# Patient Record
Sex: FEMALE | Race: OTHER
[De-identification: names, ages, dates, MRNs, and addresses within clinical notes are randomized per-mention and may not be internally consistent; named-entity substitution may affect disease eponyms.]

---

## 2020-05-12 ENCOUNTER — TRANSCRIPTION ENCOUNTER (OUTPATIENT)
Age: 20
End: 2020-05-12

## 2020-07-20 ENCOUNTER — TRANSCRIPTION ENCOUNTER (OUTPATIENT)
Age: 20
End: 2020-07-20

## 2022-07-28 ENCOUNTER — APPOINTMENT (OUTPATIENT)
Dept: OBGYN | Facility: CLINIC | Age: 22
End: 2022-07-28

## 2022-11-07 ENCOUNTER — APPOINTMENT (OUTPATIENT)
Dept: OBGYN | Facility: CLINIC | Age: 22
End: 2022-11-07

## 2022-11-07 VITALS
SYSTOLIC BLOOD PRESSURE: 130 MMHG | WEIGHT: 150 LBS | BODY MASS INDEX: 26.91 KG/M2 | DIASTOLIC BLOOD PRESSURE: 82 MMHG | HEIGHT: 62.5 IN

## 2022-11-07 DIAGNOSIS — Z00.00 ENCOUNTER FOR GENERAL ADULT MEDICAL EXAMINATION W/OUT ABNORMAL FINDINGS: ICD-10-CM

## 2022-11-07 DIAGNOSIS — Z78.9 OTHER SPECIFIED HEALTH STATUS: ICD-10-CM

## 2022-11-07 PROCEDURE — 99385 PREV VISIT NEW AGE 18-39: CPT

## 2023-10-13 ENCOUNTER — APPOINTMENT (OUTPATIENT)
Dept: OBGYN | Facility: CLINIC | Age: 23
End: 2023-10-13

## 2024-01-05 ENCOUNTER — APPOINTMENT (OUTPATIENT)
Dept: OBGYN | Facility: CLINIC | Age: 24
End: 2024-01-05
Payer: COMMERCIAL

## 2024-01-05 VITALS
WEIGHT: 155 LBS | HEIGHT: 62 IN | SYSTOLIC BLOOD PRESSURE: 136 MMHG | DIASTOLIC BLOOD PRESSURE: 93 MMHG | BODY MASS INDEX: 28.52 KG/M2

## 2024-01-05 DIAGNOSIS — Z01.411 ENCOUNTER FOR GYNECOLOGICAL EXAMINATION (GENERAL) (ROUTINE) WITH ABNORMAL FINDINGS: ICD-10-CM

## 2024-01-05 PROCEDURE — 36415 COLL VENOUS BLD VENIPUNCTURE: CPT

## 2024-01-05 PROCEDURE — 99395 PREV VISIT EST AGE 18-39: CPT

## 2024-01-05 NOTE — SIGNATURES
[TextEntry] : This note was written by Renaldo Rivas on 01/05/2024 actively solely JAROCHO Galan M.D.  All medical record entries made by the Scribe were at my, JAROCHO Galan M.D. direction and personally dictated by me on 01/05/2024. I have personally reviewed the chart and agree that the record reflects my personal performance of the history, physical exam, assessment, and plan.

## 2024-01-05 NOTE — PLAN
[FreeTextEntry1] : Routine Gyn Exam and oligomenorrhea BSA, calcium, vitamin D and exercise d/w pt No pap, could not tolerate speculum Gc/Chl off urine Advised tampon use  and how to use tampon descibed Advised pt to see PCP and dermatologist annually  Irregular menses/PCOS: Labs drawn Pt to schedule pelvic sono Nature of PCOS and how it is diagnosed was discussed She asks if stress may have caused skipped menses.  She declines OCP at this time a menses have been more regular  RTO for pelvic sono or PRN and in one year

## 2024-01-05 NOTE — PHYSICAL EXAM
[Chaperone Present] : A chaperone was present in the examining room during all aspects of the physical examination [Appropriately responsive] : appropriately responsive [Alert] : alert [No Acute Distress] : no acute distress [No Lymphadenopathy] : no lymphadenopathy [Regular Rate Rhythm] : regular rate rhythm [No Murmurs] : no murmurs [Clear to Auscultation B/L] : clear to auscultation bilaterally [Soft] : soft [Non-tender] : non-tender [Non-distended] : non-distended [No HSM] : No HSM [No Lesions] : no lesions [No Mass] : no mass [Oriented x3] : oriented x3 [Examination Of The Breasts] : a normal appearance [No Masses] : no breast masses were palpable [Labia Majora] : normal [Labia Minora] : normal [Normal] : normal [Uterine Adnexae] : normal [FreeTextEntry1] : margaux [FreeTextEntry4] : I can check vagina with one digit.  Uncomfortable for patient.  She cannot tolerate pediatric speculum for pap.  [FreeTextEntry5] : Normal on palpation  [FreeTextEntry6] : Difficult exam due to discomfort

## 2024-01-05 NOTE — HISTORY OF PRESENT ILLNESS
[No] : Patient does not have concerns regarding sex [FreeTextEntry1] : 01/05/2024. YEIMI RAO 23 year old female G0 LMP 12/28/23. She presents to establish gyn care.  Pt had skipped menses throughout most of 2022 and 2023. She notes her menses for the past 3 months have been monthly, not too heavy, not painful. She denies intermenstrual bleeding. Pt has noticed recent facial hair growth, acne, and some weight gain. Pt had an initial gyn exam in 11/22 where she was unable to tolerate a speculum exam, so no pap smear was done.  She denies abdominal and pelvic pain, no vaginal discharge or vaginitis symptoms. She reports normal urination, no dysuria, no incontinence of urine. BM is normal per patient, no blood in stool or constipation/diarrhea. She has not yet had penetrative intercourse, and has had oral sex with men. She declines STI testing  Obhx: G0 GYNhx: Denies history of fibroids, abnl pap, STI, pelvic infection, breast issues PMH: psoriasis on scalp PSH: strabismus surgery Med: Clobetasol for scalp All: NKDA Famhx: Denies FHx of breast, ovarian, uterine, colon, pancreatic, or prostate cancer. 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   [Previously active] : previously active [Men] : men [Oral] : oral [FreeTextEntry2] : No penetrative intercourse

## 2024-01-06 ENCOUNTER — TRANSCRIPTION ENCOUNTER (OUTPATIENT)
Age: 24
End: 2024-01-06

## 2024-01-07 LAB
ESTRADIOL SERPL-MCNC: 51 PG/ML
FSH SERPL-MCNC: 6.6 IU/L
GLUCOSE SERPL-MCNC: 79 MG/DL
HCG SERPL-MCNC: <1 MIU/ML
INSULIN P FAST SERPL-ACNC: 23.9 UU/ML
LH SERPL-ACNC: 14.1 IU/L
T4 FREE SERPL-MCNC: 1.3 NG/DL
TSH SERPL-ACNC: 1.22 UIU/ML

## 2024-01-11 ENCOUNTER — NON-APPOINTMENT (OUTPATIENT)
Age: 24
End: 2024-01-11

## 2024-01-15 LAB — 17OHP SERPL-MCNC: 103 NG/DL

## 2024-01-18 ENCOUNTER — APPOINTMENT (OUTPATIENT)
Dept: OBGYN | Facility: CLINIC | Age: 24
End: 2024-01-18
Payer: COMMERCIAL

## 2024-01-18 DIAGNOSIS — N91.5 OLIGOMENORRHEA, UNSPECIFIED: ICD-10-CM

## 2024-01-18 LAB
C TRACH RRNA SPEC QL NAA+PROBE: NOT DETECTED
DHEA-S SERPL-MCNC: 453 UG/DL
ESTIMATED AVERAGE GLUCOSE: 117 MG/DL
HBA1C MFR BLD HPLC: 5.7 %
INSULIN FREE SERPL-MCNC: 12 UU/ML
INSULIN: 12 UU/ML
N GONORRHOEA RRNA SPEC QL NAA+PROBE: NOT DETECTED
PROLACTIN SERPL-MCNC: 28.4 NG/ML
SOURCE AMPLIFICATION: NORMAL
TESTOST FREE SERPL-MCNC: 3.4 PG/ML
TESTOST SERPL-MCNC: 65.1 NG/DL

## 2024-01-18 PROCEDURE — 99212 OFFICE O/P EST SF 10 MIN: CPT | Mod: 95

## 2024-01-18 NOTE — HISTORY OF PRESENT ILLNESS
[FreeTextEntry1] : 01/05/2024. YEIMI RAO 23 year old female G0 LMP 12/28/23. She presents to establish gyn care.  Pt had skipped menses throughout most of 2022 and 2023. She notes her menses for the past 3 months have been monthly, not too heavy, not painful. She denies intermenstrual bleeding. Pt has noticed recent facial hair growth, acne, and some weight gain.  Labs were done at her annual and we are reviewing them today  Obhx: G0 GYNhx: Denies history of fibroids, abnl pap, STI, pelvic infection, breast issues PMH: psoriasis on scalp PSH: strabismus surgery Med: Clobetasol for scalp All: NKDA Famhx: Denies FHx of breast, ovarian, uterine, colon, pancreatic, or prostate cancer. 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

## 2024-01-18 NOTE — REASON FOR VISIT
[Home] : at home, [unfilled] , at the time of the visit. [Medical Office: ([facility])___] : at the medical office located in  [Patient] : the patient [Follow-Up] : a follow-up evaluation of

## 2024-01-18 NOTE — PLAN
[FreeTextEntry1] : Discussed lab results  Prolactin was elevated: will repeat fasting in the morning at a [facility] lab. Rx entered  HgbA1C was 5.7 This is the lower end of prediabetic range Possible Metformin discussed.  Diet and exercise discussed She desires to try diet and exercise for now  Increased DHEAS: C/W PCOS Advised to RTO for pelvic sono Discussed possible COCP If no menses for 3 mos she is to let me know The nature of PCOS was discussed  Follow up in 3-4 mos

## 2024-01-24 LAB — PROLACTIN SERPL-MCNC: 21 NG/ML

## 2024-01-25 ENCOUNTER — NON-APPOINTMENT (OUTPATIENT)
Age: 24
End: 2024-01-25

## 2024-02-21 ENCOUNTER — APPOINTMENT (OUTPATIENT)
Dept: OBGYN | Facility: CLINIC | Age: 24
End: 2024-02-21
Payer: COMMERCIAL

## 2024-02-21 ENCOUNTER — ASOB RESULT (OUTPATIENT)
Age: 24
End: 2024-02-21

## 2024-02-21 PROCEDURE — 76856 US EXAM PELVIC COMPLETE: CPT

## 2024-03-04 ENCOUNTER — NON-APPOINTMENT (OUTPATIENT)
Age: 24
End: 2024-03-04

## 2024-03-07 ENCOUNTER — NON-APPOINTMENT (OUTPATIENT)
Age: 24
End: 2024-03-07

## 2024-03-18 ENCOUNTER — NON-APPOINTMENT (OUTPATIENT)
Age: 24
End: 2024-03-18

## 2024-03-25 ENCOUNTER — APPOINTMENT (OUTPATIENT)
Dept: INTERNAL MEDICINE | Facility: CLINIC | Age: 24
End: 2024-03-25
Payer: COMMERCIAL

## 2024-03-25 VITALS
SYSTOLIC BLOOD PRESSURE: 90 MMHG | TEMPERATURE: 98.5 F | BODY MASS INDEX: 28.71 KG/M2 | HEART RATE: 88 BPM | HEIGHT: 62 IN | WEIGHT: 156 LBS | OXYGEN SATURATION: 98 % | DIASTOLIC BLOOD PRESSURE: 60 MMHG

## 2024-03-25 DIAGNOSIS — R73.09 OTHER ABNORMAL GLUCOSE: ICD-10-CM

## 2024-03-25 DIAGNOSIS — R53.83 OTHER FATIGUE: ICD-10-CM

## 2024-03-25 PROCEDURE — 99203 OFFICE O/P NEW LOW 30 MIN: CPT

## 2024-03-25 PROCEDURE — G2211 COMPLEX E/M VISIT ADD ON: CPT

## 2024-03-25 NOTE — PLAN
[FreeTextEntry1] : Cough/URI/Infectious mononucleosis - advised symptomatic relief. Will check labs PCOS - on metformin, c/w same, f/u with GYN, has endocrinology appt pending Fatigue - will check labs

## 2024-03-25 NOTE — HISTORY OF PRESENT ILLNESS
[FreeTextEntry8] : 23 year old female presents with complaints of cough and fatigue for 1 week.  She went to urgent care, viral panel was negative, she was diagnosed with infectious mononucleosis and started on prednisone/ibuprofen.  Her cough is still lingering but she denies any sputum production, recent fever or travel.

## 2024-03-25 NOTE — REVIEW OF SYSTEMS
[Fever] : fever [Chills] : chills [Fatigue] : fatigue [Vision Problems] : vision problems [Palpitations] : palpitations [Nausea] : nausea [Skin Rash] : skin rash [Discharge] : no discharge [Earache] : no earache [Nasal Discharge] : no nasal discharge [Shortness Of Breath] : no shortness of breath [Abdominal Pain] : no abdominal pain [Vomiting] : no vomiting [Dysuria] : no dysuria [Hematuria] : no hematuria [Joint Pain] : no joint pain [Muscle Pain] : no muscle pain [Itching] : no itching [Headache] : no headache [Memory Loss] : no memory loss [Suicidal] : not suicidal [Easy Bleeding] : no easy bleeding

## 2024-03-26 LAB
ALBUMIN SERPL ELPH-MCNC: 4.4 G/DL
ALP BLD-CCNC: 71 U/L
ALT SERPL-CCNC: 18 U/L
ANION GAP SERPL CALC-SCNC: 12 MMOL/L
AST SERPL-CCNC: 15 U/L
BILIRUB DIRECT SERPL-MCNC: 0.1 MG/DL
BILIRUB INDIRECT SERPL-MCNC: 0.3 MG/DL
BILIRUB SERPL-MCNC: 0.4 MG/DL
BUN SERPL-MCNC: 14 MG/DL
CALCIUM SERPL-MCNC: 9.4 MG/DL
CHLORIDE SERPL-SCNC: 99 MMOL/L
CHOLEST SERPL-MCNC: 169 MG/DL
CO2 SERPL-SCNC: 26 MMOL/L
CREAT SERPL-MCNC: 0.61 MG/DL
EGFR: 127 ML/MIN/1.73M2
ESTIMATED AVERAGE GLUCOSE: 114 MG/DL
GLUCOSE SERPL-MCNC: 78 MG/DL
HBA1C MFR BLD HPLC: 5.6 %
HCT VFR BLD CALC: 43.9 %
HDLC SERPL-MCNC: 64 MG/DL
HGB BLD-MCNC: 14 G/DL
LDLC SERPL CALC-MCNC: 91 MG/DL
MCHC RBC-ENTMCNC: 25.9 PG
MCHC RBC-ENTMCNC: 31.9 GM/DL
MCV RBC AUTO: 81.3 FL
NONHDLC SERPL-MCNC: 105 MG/DL
PLATELET # BLD AUTO: 363 K/UL
POTASSIUM SERPL-SCNC: 4.3 MMOL/L
PROT SERPL-MCNC: 7.1 G/DL
RBC # BLD: 5.4 M/UL
RBC # FLD: 14.3 %
SODIUM SERPL-SCNC: 137 MMOL/L
TRIGL SERPL-MCNC: 77 MG/DL
TSH SERPL-ACNC: 2.94 UIU/ML
WBC # FLD AUTO: 10.84 K/UL

## 2024-03-27 LAB
EBV EA AB SER IA-ACNC: <5 U/ML
EBV EA AB TITR SER IF: ABNORMAL
EBV EA IGG SER QL IA: 21.8 U/ML
EBV EA IGG SER-ACNC: NEGATIVE
EBV EA IGM SER IA-ACNC: NEGATIVE
EBV PATRN SPEC IB-IMP: NORMAL
EBV VCA IGG SER IA-ACNC: 272 U/ML
EBV VCA IGM SER QL IA: <10 U/ML
EPSTEIN-BARR VIRUS CAPSID ANTIGEN IGG: POSITIVE

## 2024-06-05 ENCOUNTER — APPOINTMENT (OUTPATIENT)
Dept: OBGYN | Facility: CLINIC | Age: 24
End: 2024-06-05
Payer: COMMERCIAL

## 2024-06-05 VITALS — DIASTOLIC BLOOD PRESSURE: 94 MMHG | SYSTOLIC BLOOD PRESSURE: 138 MMHG

## 2024-06-05 DIAGNOSIS — E28.2 POLYCYSTIC OVARIAN SYNDROME: ICD-10-CM

## 2024-06-05 PROCEDURE — 99214 OFFICE O/P EST MOD 30 MIN: CPT

## 2024-06-05 NOTE — PLAN
[FreeTextEntry1] :  PCOS: Reviewed 1/2024 Total testosterone (65, elevated), DHEAS (453 elevated) Reviewed 2/2024 pelvic sono - nml appearing ovaries D/w pt trying Metformin 1500 by taking 3 pills at once or continue 1000 mg per day D/w pt possible COCPs, or stopping metformin, and using Aygestin q3 mos- pt to consider Pt has appt w/ endocrinologist to discuss other methods of medication management Advised pt to schedule f/u appt in 3 months  RTO in 3 months or PRN

## 2024-06-05 NOTE — HISTORY OF PRESENT ILLNESS
[FreeTextEntry1] : 06/05/2024. YEIMI RAO 23 year old female G0 LMP She presents for f/u appt for amenorrhea, PCOS.  1/2024 Repeat Prolactin - nml 1/2024 Total testosterone - 65 (elevated) 1/2024 DHEAS - 453 (elevated) 2/2024 pelvic sono - nml appearing ovaries 1/2024 HgbA1C - 5.7 3/2024 Repeat A1C - 5.6  She reports h/o skipped menses for most of 2022 and 2023, improved while on Metformin w/ menses 2/1/2024, 2/26/2024, and 5/8-5/14/2024. She is currently taking Metformin 1000 (2 pill in afternoon), d/c'ed taking 1500 (1 pill morning, 2 pills afternoon) due to nausea after morning dose, was advised by PCP to not take 3 pills at once. She reports nausea is improved w/ food intake but has difficulty eating due to appetite suppression while on Metformin. She is interested in considering other options.  She has seen PCP. She has scheduled an appt w/ endocrinologist.   She denies abdominal and pelvic pain, no vaginal discharge or vaginitis symptoms. She reports normal urination, no dysuria, no incontinence of urine. BM is normal per patient, no blood in stool or constipation/diarrhea.   PMH: psoriasis on scalp Obhx: G0 GYNhx: Denies history of fibroids, abnl pap, STI, pelvic infection, breast issues PSH: strabismus surgery Med: Clobetasol for scalp All: NKDA Famhx: Denies FHx of breast, ovarian, uterine, colon, pancreatic, or prostate cancer. Soc Hx: nonsmoker, no T/D, soc alc Vaccine: S/p Gardasil

## 2024-06-05 NOTE — PHYSICAL EXAM
[Chaperone Present] : A chaperone was present in the examining room during all aspects of the physical examination [Appropriately responsive] : appropriately responsive [Alert] : alert [No Acute Distress] : no acute distress [Oriented x3] : oriented x3 [FreeTextEntry2] : Jp Ventura

## 2024-07-29 ENCOUNTER — NON-APPOINTMENT (OUTPATIENT)
Age: 24
End: 2024-07-29

## 2024-08-07 ENCOUNTER — APPOINTMENT (OUTPATIENT)
Dept: ENDOCRINOLOGY | Facility: CLINIC | Age: 24
End: 2024-08-07

## 2024-08-07 PROBLEM — E66.3 OVERWEIGHT: Status: ACTIVE | Noted: 2024-08-07

## 2024-08-07 PROBLEM — Z83.3 FAMILY HISTORY OF DIABETES MELLITUS: Status: ACTIVE | Noted: 2024-08-07

## 2024-08-07 PROBLEM — Z84.2 FAMILY HISTORY OF OVARIAN CYST: Status: ACTIVE | Noted: 2024-08-07

## 2024-08-07 PROBLEM — N92.6 IRREGULAR MENSES: Status: ACTIVE | Noted: 2024-08-07

## 2024-08-07 PROBLEM — L68.0 HIRSUTISM: Status: ACTIVE | Noted: 2024-08-07

## 2024-08-07 PROCEDURE — 99204 OFFICE O/P NEW MOD 45 MIN: CPT

## 2024-08-07 NOTE — HISTORY OF PRESENT ILLNESS
[FreeTextEntry1] : Ms. RAO is a 23-year-old female who presents for initial endocrine evaluation. She presents with regard to a history of PCOS diagnosed in Jan 2024 and Prediabetes.  She too had prolactin elevation which then returned normal on repeat. GYN told her it may have been initially high after breast exam. MRI of pituitary was thus not done. Following with GYN, Dr. Nobles  She is currently taking metformin 500mg 2 tabs HS (started in Jan 2024). She was supposed to be taking it 3x per day however felt nauseous on higher doses.  Does has soft stools.   She did an abdominal/pelvic US winter 2024, no cysts were seen.   She does have hx of irregular menses since age 19, but now is regular on Metformin. In past, she had gone 8 months without menses oct 2022 - mar 2023 however was then coming monthly afterwards.   She is not on OCP.  No plans for pregnancy at this time.   No issues with hair thinning.  Had issues with acne in college and then resolved. As of late, notes breakouts on body and neck.  She notes hirsutism on chin and upper lip and around belly button, was doing laser for 4 sessions. Now waxing (upper lip), plucking (chin), and shaving (face) every day. Hair grows back within a few days if she is not consistent.    Prior weight was stable growing up however as of last couple of years she has been gaining weight.  She has been trying to work out, walking 3x week on treadmill. She does Pilates 1x week.  She has been trying to control portions and not easing as much.   Current weight 146 lbs, prior 156 lbs in March 2024  Goal weight 125 lbs   Her biggest concern at this time is the weight and hirsutism.   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

## 2024-08-07 NOTE — HISTORY OF PRESENT ILLNESS
[FreeTextEntry1] : Ms. RAO is a 23-year-old female who presents for initial endocrine evaluation. She presents with regard to a history of PCOS diagnosed in Jan 2024 and Prediabetes.  She too had prolactin elevation which then returned normal on repeat. GYN told her it may have been initially high after breast exam. MRI of pituitary was thus not done. Following with GYN, Dr. Nobles  She is currently taking metformin 500mg 2 tabs HS (started in Jan 2024). She was supposed to be taking it 3x per day however felt nauseous on higher doses.  Does has soft stools.   She did an abdominal/pelvic US winter 2024, no cysts were seen.   She does have hx of irregular menses since age 19, but now is regular on Metformin. In past, she had gone 8 months without menses oct 2022 - mar 2023 however was then coming monthly afterwards.   She is not on OCP.  No plans for pregnancy at this time.   No issues with hair thinning.  Had issues with acne in college and then resolved. As of late, notes breakouts on body and neck.  She notes hirsutism on chin and upper lip and around belly button, was doing laser for 4 sessions. Now waxing (upper lip), plucking (chin), and shaving (face) every day. Hair grows back within a few days if she is not consistent.    Prior weight was stable growing up however as of last couple of years she has been gaining weight.  She has been trying to work out, walking 3x week on treadmill. She does Pilates 1x week.  She has been trying to control portions and not easing as much.   Current weight 146 lbs, prior 156 lbs in March 2024  Goal weight 125 lbs   Her biggest concern at this time is the weight and hirsutism.   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

## 2024-08-07 NOTE — ADDENDUM
[FreeTextEntry1] : This note was written by Deborah Ba on 08/07/2024 acting as medical scribe for Dr. Ren Babcock. I, Dr. Ren Babcock, have read and attest that all the information, medical decision making and discharge instructions within are true and accurate.

## 2024-08-07 NOTE — HISTORY OF PRESENT ILLNESS
[FreeTextEntry1] : Ms. RAO is a 23-year-old female who presents for initial endocrine evaluation. She presents with regard to a history of PCOS diagnosed in Jan 2024 and Prediabetes.  She too had prolactin elevation which then returned normal on repeat. GYN told her it may have been initially high after breast exam. MRI of pituitary was thus not done. Following with GYN, Dr. Nobles  She is currently taking metformin 500mg 2 tabs HS (started in Jan 2024). She was supposed to be taking it 3x per day however felt nauseous on higher doses.  Does has soft stools.   She did an abdominal/pelvic US winter 2024, no cysts were seen.   She does have hx of irregular menses since age 19, but now is regular on Metformin. In past, she had gone 8 months without menses oct 2022 - mar 2023 however was then coming monthly afterwards.   She is not on OCP.  No plans for pregnancy at this time.   No issues with hair thinning.  Had issues with acne in college and then resolved. As of late, notes breakouts on body and neck.  She notes hirsutism on chin and upper lip and around belly button, was doing laser for 4 sessions. Now waxing (upper lip), plucking (chin), and shaving (face) every day. Hair grows back within a few days if she is not consistent.    Prior weight was stable growing up however as of last couple of years she has been gaining weight.  She has been trying to work out, walking 3x week on treadmill. She does Pilates 1x week.  She has been trying to control portions and not easing as much.   Current weight 146 lbs, prior 156 lbs in March 2024  Goal weight 125 lbs   Her biggest concern at this time is the weight and hirsutism.   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

## 2024-08-23 ENCOUNTER — APPOINTMENT (OUTPATIENT)
Dept: INTERNAL MEDICINE | Facility: CLINIC | Age: 24
End: 2024-08-23

## 2024-09-03 DIAGNOSIS — E55.9 VITAMIN D DEFICIENCY, UNSPECIFIED: ICD-10-CM

## 2024-09-03 DIAGNOSIS — E53.8 DEFICIENCY OF OTHER SPECIFIED B GROUP VITAMINS: ICD-10-CM

## 2024-09-03 RX ORDER — ERGOCALCIFEROL 1.25 MG/1
1.25 MG CAPSULE, LIQUID FILLED ORAL
Qty: 12 | Refills: 0 | Status: ACTIVE | COMMUNITY
Start: 2024-09-03 | End: 1900-01-01

## 2024-09-04 ENCOUNTER — APPOINTMENT (OUTPATIENT)
Dept: OBGYN | Facility: CLINIC | Age: 24
End: 2024-09-04

## 2024-09-27 ENCOUNTER — APPOINTMENT (OUTPATIENT)
Dept: OBGYN | Facility: CLINIC | Age: 24
End: 2024-09-27

## 2024-10-07 ENCOUNTER — OUTPATIENT (OUTPATIENT)
Dept: OUTPATIENT SERVICES | Facility: HOSPITAL | Age: 24
LOS: 1 days | Discharge: HOME | End: 2024-10-07

## 2024-10-07 ENCOUNTER — APPOINTMENT (OUTPATIENT)
Dept: OBGYN | Facility: CLINIC | Age: 24
End: 2024-10-07
Payer: COMMERCIAL

## 2024-10-07 VITALS — SYSTOLIC BLOOD PRESSURE: 124 MMHG | DIASTOLIC BLOOD PRESSURE: 70 MMHG

## 2024-10-07 DIAGNOSIS — D56.3 THALASSEMIA MINOR: ICD-10-CM

## 2024-10-07 DIAGNOSIS — E28.2 POLYCYSTIC OVARIAN SYNDROME: ICD-10-CM

## 2024-10-07 PROCEDURE — 99213 OFFICE O/P EST LOW 20 MIN: CPT

## 2024-10-16 ENCOUNTER — APPOINTMENT (OUTPATIENT)
Dept: HEMATOLOGY ONCOLOGY | Facility: CLINIC | Age: 24
End: 2024-10-16

## 2024-12-20 ENCOUNTER — NON-APPOINTMENT (OUTPATIENT)
Age: 24
End: 2024-12-20

## 2025-01-29 ENCOUNTER — APPOINTMENT (OUTPATIENT)
Dept: OPHTHALMOLOGY | Facility: CLINIC | Age: 25
End: 2025-01-29

## 2025-01-29 ENCOUNTER — APPOINTMENT (OUTPATIENT)
Dept: ENDOCRINOLOGY | Facility: CLINIC | Age: 25
End: 2025-01-29

## 2025-05-05 ENCOUNTER — NON-APPOINTMENT (OUTPATIENT)
Age: 25
End: 2025-05-05

## 2025-05-05 ENCOUNTER — APPOINTMENT (OUTPATIENT)
Dept: INTERNAL MEDICINE | Facility: CLINIC | Age: 25
End: 2025-05-05

## 2025-05-21 ENCOUNTER — APPOINTMENT (OUTPATIENT)
Dept: OBGYN | Facility: CLINIC | Age: 25
End: 2025-05-21

## 2025-06-04 ENCOUNTER — APPOINTMENT (OUTPATIENT)
Dept: INTERNAL MEDICINE | Facility: CLINIC | Age: 25
End: 2025-06-04
Payer: COMMERCIAL

## 2025-06-04 VITALS
DIASTOLIC BLOOD PRESSURE: 70 MMHG | RESPIRATION RATE: 16 BRPM | TEMPERATURE: 98.2 F | BODY MASS INDEX: 27.42 KG/M2 | WEIGHT: 149 LBS | HEART RATE: 72 BPM | HEIGHT: 62 IN | SYSTOLIC BLOOD PRESSURE: 112 MMHG | OXYGEN SATURATION: 98 %

## 2025-06-04 DIAGNOSIS — R73.09 OTHER ABNORMAL GLUCOSE: ICD-10-CM

## 2025-06-04 DIAGNOSIS — E55.9 VITAMIN D DEFICIENCY, UNSPECIFIED: ICD-10-CM

## 2025-06-04 DIAGNOSIS — E53.8 DEFICIENCY OF OTHER SPECIFIED B GROUP VITAMINS: ICD-10-CM

## 2025-06-04 DIAGNOSIS — E28.2 POLYCYSTIC OVARIAN SYNDROME: ICD-10-CM

## 2025-06-04 DIAGNOSIS — R53.83 OTHER FATIGUE: ICD-10-CM

## 2025-06-04 DIAGNOSIS — E66.3 OVERWEIGHT: ICD-10-CM

## 2025-06-04 PROCEDURE — 99395 PREV VISIT EST AGE 18-39: CPT

## 2025-06-05 DIAGNOSIS — E78.5 HYPERLIPIDEMIA, UNSPECIFIED: ICD-10-CM

## 2025-06-05 DIAGNOSIS — R73.03 PREDIABETES.: ICD-10-CM

## 2025-06-05 DIAGNOSIS — R79.89 OTHER SPECIFIED ABNORMAL FINDINGS OF BLOOD CHEMISTRY: ICD-10-CM

## 2025-06-05 LAB
25(OH)D3 SERPL-MCNC: 17.9 NG/ML
ALBUMIN SERPL ELPH-MCNC: 4.5 G/DL
ALP BLD-CCNC: 65 U/L
ALT SERPL-CCNC: 21 U/L
ANION GAP SERPL CALC-SCNC: 15 MMOL/L
APPEARANCE: CLEAR
AST SERPL-CCNC: 19 U/L
BASOPHILS # BLD AUTO: 0.05 K/UL
BASOPHILS NFR BLD AUTO: 1 %
BILIRUB DIRECT SERPL-MCNC: 0.19 MG/DL
BILIRUB INDIRECT SERPL-MCNC: 0.4 MG/DL
BILIRUB SERPL-MCNC: 0.6 MG/DL
BILIRUBIN URINE: NEGATIVE
BLOOD URINE: NEGATIVE
BUN SERPL-MCNC: 8 MG/DL
CALCIUM SERPL-MCNC: 9.8 MG/DL
CHLORIDE SERPL-SCNC: 103 MMOL/L
CHOLEST SERPL-MCNC: 201 MG/DL
CO2 SERPL-SCNC: 22 MMOL/L
COLOR: YELLOW
CREAT SERPL-MCNC: 0.67 MG/DL
EGFRCR SERPLBLD CKD-EPI 2021: 124 ML/MIN/1.73M2
EOSINOPHIL # BLD AUTO: 0.23 K/UL
EOSINOPHIL NFR BLD AUTO: 4.8 %
ESTIMATED AVERAGE GLUCOSE: 123 MG/DL
GLUCOSE QUALITATIVE U: NEGATIVE MG/DL
GLUCOSE SERPL-MCNC: 88 MG/DL
HBA1C MFR BLD HPLC: 5.9 %
HCT VFR BLD CALC: 42.5 %
HDLC SERPL-MCNC: 64 MG/DL
HGB A MFR BLD: 97.3 %
HGB A2 MFR BLD: 2.7 %
HGB BLD-MCNC: 13.3 G/DL
HGB FRACT BLD-IMP: NORMAL
IMM GRANULOCYTES NFR BLD AUTO: 0.2 %
KETONES URINE: NEGATIVE MG/DL
LDLC SERPL-MCNC: 126 MG/DL
LEUKOCYTE ESTERASE URINE: NEGATIVE
LYMPHOCYTES # BLD AUTO: 2.21 K/UL
LYMPHOCYTES NFR BLD AUTO: 46 %
MAN DIFF?: NORMAL
MCHC RBC-ENTMCNC: 25.2 PG
MCHC RBC-ENTMCNC: 31.3 G/DL
MCV RBC AUTO: 80.5 FL
MONOCYTES # BLD AUTO: 0.36 K/UL
MONOCYTES NFR BLD AUTO: 7.5 %
NEUTROPHILS # BLD AUTO: 1.94 K/UL
NEUTROPHILS NFR BLD AUTO: 40.5 %
NITRITE URINE: NEGATIVE
NONHDLC SERPL-MCNC: 137 MG/DL
PH URINE: 7
PLATELET # BLD AUTO: 311 K/UL
POTASSIUM SERPL-SCNC: 4.5 MMOL/L
PROT SERPL-MCNC: 7 G/DL
PROTEIN URINE: NEGATIVE MG/DL
RBC # BLD: 5.28 M/UL
RBC # FLD: 14.8 %
SODIUM SERPL-SCNC: 140 MMOL/L
SPECIFIC GRAVITY URINE: 1.02
T4 FREE SERPL-MCNC: 1.2 NG/DL
TRIGL SERPL-MCNC: 60 MG/DL
TSH SERPL-ACNC: 1.21 UIU/ML
UROBILINOGEN URINE: 0.2 MG/DL
VIT B12 SERPL-MCNC: 288 PG/ML
WBC # FLD AUTO: 4.8 K/UL

## 2025-06-18 ENCOUNTER — OUTPATIENT (OUTPATIENT)
Dept: OUTPATIENT SERVICES | Facility: HOSPITAL | Age: 25
LOS: 1 days | Discharge: HOME | End: 2025-06-18

## 2025-06-18 DIAGNOSIS — D56.3 THALASSEMIA MINOR: ICD-10-CM

## 2025-06-23 ENCOUNTER — RESULT REVIEW (OUTPATIENT)
Age: 25
End: 2025-06-23

## 2025-06-23 ENCOUNTER — APPOINTMENT (OUTPATIENT)
Dept: HEMATOLOGY ONCOLOGY | Facility: CLINIC | Age: 25
End: 2025-06-23

## 2025-06-23 LAB
BASOPHILS # BLD AUTO: 0.05 K/UL — SIGNIFICANT CHANGE UP (ref 0–0.2)
BASOPHILS NFR BLD AUTO: 1.4 % — SIGNIFICANT CHANGE UP (ref 0–2)
EOSINOPHIL # BLD AUTO: 0.12 K/UL — SIGNIFICANT CHANGE UP (ref 0–0.5)
EOSINOPHIL NFR BLD AUTO: 3.4 % — SIGNIFICANT CHANGE UP (ref 0–6)
HCT VFR BLD CALC: 41.8 % — SIGNIFICANT CHANGE UP (ref 34.5–45)
HGB BLD-MCNC: 13.6 G/DL — SIGNIFICANT CHANGE UP (ref 11.5–15.5)
IMM GRANULOCYTES NFR BLD AUTO: 0 % — SIGNIFICANT CHANGE UP (ref 0–0.9)
LYMPHOCYTES # BLD AUTO: 1.76 K/UL — SIGNIFICANT CHANGE UP (ref 1–3.3)
LYMPHOCYTES # BLD AUTO: 50.3 % — HIGH (ref 13–44)
MCHC RBC-ENTMCNC: 25.7 PG — LOW (ref 27–34)
MCHC RBC-ENTMCNC: 32.5 G/DL — SIGNIFICANT CHANGE UP (ref 32–36)
MCV RBC AUTO: 78.9 FL — LOW (ref 80–100)
MONOCYTES # BLD AUTO: 0.23 K/UL — SIGNIFICANT CHANGE UP (ref 0–0.9)
MONOCYTES NFR BLD AUTO: 6.6 % — SIGNIFICANT CHANGE UP (ref 2–14)
NEUTROPHILS # BLD AUTO: 1.34 K/UL — LOW (ref 1.8–7.4)
NEUTROPHILS NFR BLD AUTO: 38.3 % — LOW (ref 43–77)
NRBC BLD AUTO-RTO: 0 /100 WBCS — SIGNIFICANT CHANGE UP (ref 0–0)
PLATELET # BLD AUTO: 317 K/UL — SIGNIFICANT CHANGE UP (ref 150–400)
RBC # BLD: 5.3 M/UL — HIGH (ref 3.8–5.2)
RBC # FLD: 14.3 % — SIGNIFICANT CHANGE UP (ref 10.3–14.5)
RETICS #: 51.4 K/UL — SIGNIFICANT CHANGE UP (ref 25–125)
RETICS/RBC NFR: 1 % — SIGNIFICANT CHANGE UP (ref 0.5–2.5)
WBC # BLD: 3.5 K/UL — LOW (ref 3.8–10.5)
WBC # FLD AUTO: 3.5 K/UL — LOW (ref 3.8–10.5)

## 2025-06-23 PROCEDURE — 99205 OFFICE O/P NEW HI 60 MIN: CPT

## 2025-06-27 LAB
ALBUMIN SERPL ELPH-MCNC: 4.6 G/DL
ALP BLD-CCNC: 61 U/L
ALT SERPL-CCNC: 19 U/L
ANION GAP SERPL CALC-SCNC: 14 MMOL/L
AST SERPL-CCNC: 20 U/L
BILIRUB SERPL-MCNC: 0.4 MG/DL
BUN SERPL-MCNC: 10 MG/DL
CALCIUM SERPL-MCNC: 9.6 MG/DL
CHLORIDE SERPL-SCNC: 103 MMOL/L
CO2 SERPL-SCNC: 21 MMOL/L
CREAT SERPL-MCNC: 0.66 MG/DL
EGFRCR SERPLBLD CKD-EPI 2021: 124 ML/MIN/1.73M2
FERRITIN SERPL-MCNC: 19 NG/ML
FOLATE SERPL-MCNC: 15.5 NG/ML
GLUCOSE SERPL-MCNC: 88 MG/DL
HAPTOGLOB SERPL-MCNC: 134 MG/DL
IRON SATN MFR SERPL: 18 %
IRON SERPL-MCNC: 65 UG/DL
LDH SERPL-CCNC: 187 U/L
METHYLMALONATE SERPL-SCNC: 100 NMOL/L
PCA AB SER QL IF: NORMAL
POTASSIUM SERPL-SCNC: 4.4 MMOL/L
PROT SERPL-MCNC: 7.2 G/DL
SODIUM SERPL-SCNC: 138 MMOL/L
TIBC SERPL-MCNC: 371 UG/DL
UIBC SERPL-MCNC: 305 UG/DL
VIT B12 SERPL-MCNC: 339 PG/ML

## 2025-07-02 ENCOUNTER — APPOINTMENT (OUTPATIENT)
Dept: OBGYN | Facility: CLINIC | Age: 25
End: 2025-07-02
Payer: COMMERCIAL

## 2025-07-02 VITALS
BODY MASS INDEX: 27.23 KG/M2 | HEIGHT: 62 IN | WEIGHT: 148 LBS | SYSTOLIC BLOOD PRESSURE: 137 MMHG | DIASTOLIC BLOOD PRESSURE: 90 MMHG

## 2025-07-02 VITALS — SYSTOLIC BLOOD PRESSURE: 120 MMHG | DIASTOLIC BLOOD PRESSURE: 72 MMHG

## 2025-07-02 PROBLEM — R10.2 PELVIC PAIN: Status: ACTIVE | Noted: 2025-07-02

## 2025-07-02 PROCEDURE — 99395 PREV VISIT EST AGE 18-39: CPT

## 2025-07-02 PROCEDURE — 36415 COLL VENOUS BLD VENIPUNCTURE: CPT

## 2025-07-02 PROCEDURE — 99459 PELVIC EXAMINATION: CPT

## 2025-07-02 PROCEDURE — G0444 DEPRESSION SCREEN ANNUAL: CPT | Mod: 59

## 2025-07-03 LAB
HBV SURFACE AG SER QL: NONREACTIVE
HCV AB SER QL: NONREACTIVE
HCV S/CO RATIO: 0.14 S/CO
HIV1+2 AB SPEC QL IA.RAPID: NONREACTIVE

## 2025-07-04 LAB
ALPHA - GLOBIN COMMON MUTATION RESULT: ABNORMAL
ALPHA - GLOBIN MUTATION VERBATIM: NORMAL

## 2025-07-07 LAB
C TRACH RRNA SPEC QL NAA+PROBE: NOT DETECTED
CYTOLOGY CVX/VAG DOC THIN PREP: NORMAL
N GONORRHOEA RRNA SPEC QL NAA+PROBE: NOT DETECTED
SOURCE TP AMPLIFICATION: NORMAL
T PALLIDUM AB SER QL IA: NEGATIVE

## 2025-07-21 ENCOUNTER — NON-APPOINTMENT (OUTPATIENT)
Age: 25
End: 2025-07-21

## 2025-07-21 DIAGNOSIS — R71.8 OTHER ABNORMALITY OF RED BLOOD CELLS: ICD-10-CM

## 2025-07-21 DIAGNOSIS — D72.820 LYMPHOCYTOSIS (SYMPTOMATIC): ICD-10-CM

## 2025-07-21 DIAGNOSIS — E53.8 DEFICIENCY OF OTHER SPECIFIED B GROUP VITAMINS: ICD-10-CM

## 2025-07-24 ENCOUNTER — APPOINTMENT (OUTPATIENT)
Dept: INTERNAL MEDICINE | Facility: CLINIC | Age: 25
End: 2025-07-24
Payer: COMMERCIAL

## 2025-07-24 VITALS
WEIGHT: 150 LBS | OXYGEN SATURATION: 96 % | BODY MASS INDEX: 27.44 KG/M2 | HEART RATE: 71 BPM | RESPIRATION RATE: 16 BRPM | TEMPERATURE: 98.2 F | SYSTOLIC BLOOD PRESSURE: 118 MMHG | DIASTOLIC BLOOD PRESSURE: 70 MMHG

## 2025-07-24 DIAGNOSIS — R73.03 PREDIABETES.: ICD-10-CM

## 2025-07-24 DIAGNOSIS — Z02.0 ENCOUNTER FOR EXAMINATION FOR ADMISSION TO EDUCATIONAL INSTITUTION: ICD-10-CM

## 2025-07-24 PROCEDURE — G2211 COMPLEX E/M VISIT ADD ON: CPT

## 2025-07-24 PROCEDURE — 99213 OFFICE O/P EST LOW 20 MIN: CPT

## 2025-07-26 LAB
MEV IGG FLD QL IA: >300 AU/ML
MEV IGG+IGM SER-IMP: POSITIVE
MUV AB SER-ACNC: POSITIVE
MUV IGG SER QL IA: >300 AU/ML
RUBV IGG FLD-ACNC: 6.61 INDEX
RUBV IGG SER-IMP: POSITIVE
VZV AB TITR SER: POSITIVE
VZV IGG SER IF-ACNC: 1.15 S/CO

## 2025-07-28 LAB
M TB IFN-G BLD-IMP: NEGATIVE
QUANTIFERON TB PLUS MITOGEN MINUS NIL: >10 IU/ML
QUANTIFERON TB PLUS NIL: 0.03 IU/ML
QUANTIFERON TB PLUS TB1 MINUS NIL: 0 IU/ML
QUANTIFERON TB PLUS TB2 MINUS NIL: 0 IU/ML

## 2025-08-13 ENCOUNTER — APPOINTMENT (OUTPATIENT)
Dept: OPHTHALMOLOGY | Facility: CLINIC | Age: 25
End: 2025-08-13

## 2025-08-13 ENCOUNTER — APPOINTMENT (OUTPATIENT)
Dept: ENDOCRINOLOGY | Facility: CLINIC | Age: 25
End: 2025-08-13

## 2025-09-04 ENCOUNTER — APPOINTMENT (OUTPATIENT)
Dept: OBGYN | Facility: CLINIC | Age: 25
End: 2025-09-04

## 2025-09-10 ENCOUNTER — APPOINTMENT (OUTPATIENT)
Dept: INTERNAL MEDICINE | Facility: CLINIC | Age: 25
End: 2025-09-10